# Patient Record
Sex: FEMALE | Race: BLACK OR AFRICAN AMERICAN | HISPANIC OR LATINO | Employment: UNEMPLOYED | URBAN - METROPOLITAN AREA
[De-identification: names, ages, dates, MRNs, and addresses within clinical notes are randomized per-mention and may not be internally consistent; named-entity substitution may affect disease eponyms.]

---

## 2018-07-09 ENCOUNTER — HOSPITAL ENCOUNTER (EMERGENCY)
Facility: HOSPITAL | Age: 58
Discharge: HOME | End: 2018-07-09
Attending: EMERGENCY MEDICINE

## 2018-07-09 VITALS
RESPIRATION RATE: 16 BRPM | OXYGEN SATURATION: 98 % | WEIGHT: 194 LBS | HEART RATE: 89 BPM | BODY MASS INDEX: 37.89 KG/M2 | TEMPERATURE: 98.1 F | SYSTOLIC BLOOD PRESSURE: 134 MMHG | DIASTOLIC BLOOD PRESSURE: 77 MMHG

## 2018-07-09 DIAGNOSIS — H10.503: Primary | ICD-10-CM

## 2018-07-09 PROCEDURE — 99283 EMERGENCY DEPT VISIT LOW MDM: CPT

## 2018-07-09 RX ORDER — BACITRACIN 500 [USP'U]/G
OINTMENT OPHTHALMIC 3 TIMES DAILY
Qty: 3.5 G | Refills: 0 | Status: SHIPPED | OUTPATIENT
Start: 2018-07-09 | End: 2022-04-04

## 2018-07-09 RX ORDER — BACITRACIN 500 [USP'U]/G
OINTMENT OPHTHALMIC ONCE
Status: COMPLETED | OUTPATIENT
Start: 2018-07-09 | End: 2018-07-09

## 2018-07-09 RX ADMIN — BACITRACIN 1 APPLICATION: 500 OINTMENT OPHTHALMIC at 20:55

## 2018-07-10 NOTE — ED PROVIDER NOTES
History  Chief Complaint   Patient presents with    Eye Pain     Pt  arrives with complaints of bilateral eye pain "for a while" states sees eye doctor and was told has "extra wax build up" states has been steaming eyes like told by eye doctor but still having pain  History provided by:  Patient   used: No    66 y/o female presents for re-eval of b/l eye discomfort for about a month  No visual changes  Has some conjunctival injection and also inflammation of lower lids  Had been seen by eye doctor at onset of sx and told she had excess wax buildup and was to steam eyes  Notes on abx at one point and had improved but worsened she she stopped  Discomfort moderate, fairly constant, both eyes  Plan abx ointment and ophtho re-eval  Likely blepharoconjunctivitis  None       Past Medical History:   Diagnosis Date    Hypertension        No past surgical history on file  No family history on file  I have reviewed and agree with the history as documented  Social History   Substance Use Topics    Smoking status: Current Every Day Smoker     Packs/day: 0 25    Smokeless tobacco: Never Used    Alcohol use Yes      Comment: socially        Review of Systems   Constitutional: Negative for activity change, appetite change, fatigue and fever  Eyes: Positive for pain, redness and visual disturbance  Negative for photophobia  Neurological: Negative for dizziness, weakness and headaches  All other systems reviewed and are negative  Physical Exam  Physical Exam   Constitutional: She is oriented to person, place, and time  She appears well-developed and well-nourished  No distress  HENT:   Head: Normocephalic and atraumatic  Eyes: EOM are normal  Pupils are equal, round, and reactive to light  Mildly injected b/l conjunctivae  Inflammation along b/l lower lid margin  Normal visual acuity per patient  Neck: Normal range of motion  Neck supple     Cardiovascular: Normal rate and regular rhythm  Pulmonary/Chest: Effort normal  No respiratory distress  Neurological: She is alert and oriented to person, place, and time  Skin: Skin is warm and dry  Psychiatric: She has a normal mood and affect  Her behavior is normal    Nursing note and vitals reviewed  Vital Signs  ED Triage Vitals   Temperature Pulse Respirations Blood Pressure SpO2   07/09/18 1946 07/09/18 1946 07/09/18 1946 07/09/18 2004 07/09/18 1946   98 1 °F (36 7 °C) 89 16 134/77 98 %      Temp Source Heart Rate Source Patient Position - Orthostatic VS BP Location FiO2 (%)   07/09/18 1946 07/09/18 1946 -- -- --   Oral Monitor         Pain Score       07/09/18 1946       8           Vitals:    07/09/18 1946 07/09/18 2004   BP:  134/77   Pulse: 89        Visual Acuity  Visual Acuity      Most Recent Value   L Pupil Size (mm)  3   R Pupil Size (mm)  3          ED Medications  Medications   bacitracin ophthalmic ointment (1 application Both Eyes Given 7/9/18 2055)       Diagnostic Studies  Results Reviewed     None                 No orders to display              Procedures  Procedures       Phone Contacts  ED Phone Contact    ED Course                               MDM  Number of Diagnoses or Management Options  Blepharoconjunctivitis of both eyes:   Diagnosis management comments: 64 y/o female with about a month of b/l eye discomfort with conjunctival and lower lid inflammation  No visual changes  Had improved on abx in the past  Given abx ointment and referred to ophtho      Patient Progress  Patient progress: stable    CritCare Time    Disposition  Final diagnoses:   Blepharoconjunctivitis of both eyes     Time reflects when diagnosis was documented in both MDM as applicable and the Disposition within this note     Time User Action Codes Description Comment    7/9/2018  8:44 PM Dandy Mooney Add [H10 503] Blepharoconjunctivitis of both eyes       ED Disposition     ED Disposition Condition Comment    Discharge Gulshan Beverly discharge to home/self care  Condition at discharge: Good        Follow-up Information     Follow up With Specialties Details Why Contact Info    Judye Dakins, MD Ophthalmology   [place] [address]  [address]  [phone]            Discharge Medication List as of 7/9/2018  8:46 PM      START taking these medications    Details   bacitracin ophthalmic ointment Administer to both eyes 3 (three) times a day, Starting Mon 7/9/2018, Print           No discharge procedures on file      ED Provider  Electronically Signed by           Nils Kelley MD  07/24/18 0662

## 2018-07-10 NOTE — DISCHARGE INSTRUCTIONS
Blepharitis   WHAT YOU NEED TO KNOW:   What is blepharitis? Blepharitis is inflammation of one or both eyelids  Your eyelid, eyelashes, oil glands, or whites of the eye may be affected  What causes blepharitis? · Seborrheic dermatitis (red, scaly, itchy skin)    · Clogged oil glands    · Infection on your eyelid caused by bacteria    · Acne rosacea  What are the signs and symptoms of blepharitis? · Burning and itching    · Redness of your eyelid or the whites of your eye    · Watery or dry eye    Feeling like there is something in your eye    · Decreased vision or sensitivity to light  How is blepharitis diagnosed? · A visual acuity test  is used to check your vision and eye movements  · A sample of skin or drainage  may be sent to a lab to find the cause of your infection  · A slit-lamp test  is used to examine your eye with a microscope  How is blepharitis treated? Medicines can help decrease pain and swelling, or treat an infection  How can I manage my symptoms? Always wash your hands with soap and water before and after eye care:  · Use artificial tears  twice a day if you have dry eye  · Apply a warm compress  for 10 minutes once a day to loosen crusts and to decrease itching and burning  · Gently scrub your upper and lower eyelid  with 2 to 3 drops of baby shampoo in ½ cup warm water 2 times a day  This will help open your clogged oil glands and remove pus or other material stuck to your eyelid  · Massage your upper and lower eyelid in small circles for 5 seconds  to loosen oil plugs and to decrease inflammation  · Do not wear contact lenses or eye makeup  until your eye has healed  When should I contact my healthcare provider? · Your vision changes  · Your signs and symptoms get worse, even after treatment  · Your signs and symptoms return  · You have a lump on your eyelid  · You have a pus-filled sore on your eyelid      · You have questions or concerns about your condition or care  When should I seek immediate care or call 911? · You have severe pain  · You have vision loss  CARE AGREEMENT:   You have the right to help plan your care  Learn about your health condition and how it may be treated  Discuss treatment options with your caregivers to decide what care you want to receive  You always have the right to refuse treatment  The above information is an  only  It is not intended as medical advice for individual conditions or treatments  Talk to your doctor, nurse or pharmacist before following any medical regimen to see if it is safe and effective for you  © 2017 [facility] [employer] Information is for End User's use only and may not be sold, redistributed or otherwise used for commercial purposes  All illustrations and images included in CareNotes® are the copyrighted property of A D A M , Inc  or [employer]

## 2020-11-17 ENCOUNTER — APPOINTMENT (EMERGENCY)
Dept: RADIOLOGY | Facility: HOSPITAL | Age: 60
End: 2020-11-17

## 2020-11-17 ENCOUNTER — HOSPITAL ENCOUNTER (EMERGENCY)
Facility: HOSPITAL | Age: 60
Discharge: HOME | End: 2020-11-17
Attending: EMERGENCY MEDICINE | Admitting: EMERGENCY MEDICINE

## 2020-11-17 VITALS
SYSTOLIC BLOOD PRESSURE: 149 MMHG | OXYGEN SATURATION: 100 % | DIASTOLIC BLOOD PRESSURE: 78 MMHG | WEIGHT: 190 LBS | HEART RATE: 89 BPM | TEMPERATURE: 97.3 F | RESPIRATION RATE: 17 BRPM

## 2020-11-17 DIAGNOSIS — S90.129A TOE CONTUSION: Primary | ICD-10-CM

## 2020-11-17 PROCEDURE — 99283 EMERGENCY DEPT VISIT LOW MDM: CPT

## 2020-11-17 PROCEDURE — 73660 X-RAY EXAM OF TOE(S): CPT

## 2020-11-17 PROCEDURE — 99285 EMERGENCY DEPT VISIT HI MDM: CPT | Performed by: EMERGENCY MEDICINE

## 2020-11-17 RX ORDER — IBUPROFEN 600 MG/1
600 TABLET ORAL EVERY 6 HOURS PRN
Qty: 60 TABLET | Refills: 0 | Status: SHIPPED | OUTPATIENT
Start: 2020-11-17 | End: 2022-04-04

## 2020-11-17 RX ORDER — IBUPROFEN 600 MG/1
600 TABLET ORAL ONCE
Status: COMPLETED | OUTPATIENT
Start: 2020-11-17 | End: 2020-11-17

## 2020-11-17 RX ADMIN — IBUPROFEN 600 MG: 600 TABLET, FILM COATED ORAL at 03:16

## 2021-11-22 ENCOUNTER — APPOINTMENT (EMERGENCY)
Dept: RADIOLOGY | Facility: HOSPITAL | Age: 61
End: 2021-11-22
Payer: COMMERCIAL

## 2021-11-22 ENCOUNTER — HOSPITAL ENCOUNTER (EMERGENCY)
Facility: HOSPITAL | Age: 61
Discharge: HOME | End: 2021-11-22
Payer: COMMERCIAL

## 2021-11-22 VITALS
SYSTOLIC BLOOD PRESSURE: 180 MMHG | DIASTOLIC BLOOD PRESSURE: 100 MMHG | WEIGHT: 200 LBS | TEMPERATURE: 97.4 F | RESPIRATION RATE: 18 BRPM | HEART RATE: 96 BPM | HEIGHT: 59 IN | BODY MASS INDEX: 40.32 KG/M2 | OXYGEN SATURATION: 97 %

## 2021-11-22 DIAGNOSIS — S43.401A SPRAIN OF RIGHT SHOULDER, UNSPECIFIED SHOULDER SPRAIN TYPE, INITIAL ENCOUNTER: Primary | ICD-10-CM

## 2021-11-22 PROCEDURE — 99284 EMERGENCY DEPT VISIT MOD MDM: CPT

## 2021-11-22 PROCEDURE — 73030 X-RAY EXAM OF SHOULDER: CPT

## 2021-11-22 PROCEDURE — 96372 THER/PROPH/DIAG INJ SC/IM: CPT

## 2021-11-22 PROCEDURE — 99283 EMERGENCY DEPT VISIT LOW MDM: CPT

## 2021-11-22 RX ORDER — CYCLOBENZAPRINE HCL 10 MG
10 TABLET ORAL 2 TIMES DAILY PRN
Qty: 20 TABLET | Refills: 0 | Status: SHIPPED | OUTPATIENT
Start: 2021-11-22 | End: 2022-04-04

## 2021-11-22 RX ORDER — KETOROLAC TROMETHAMINE 30 MG/ML
30 INJECTION, SOLUTION INTRAMUSCULAR; INTRAVENOUS ONCE
Status: COMPLETED | OUTPATIENT
Start: 2021-11-22 | End: 2021-11-22

## 2021-11-22 RX ORDER — NAPROXEN 500 MG/1
500 TABLET ORAL 2 TIMES DAILY WITH MEALS
Qty: 30 TABLET | Refills: 0 | Status: SHIPPED | OUTPATIENT
Start: 2021-11-22 | End: 2022-04-04

## 2021-11-22 RX ADMIN — KETOROLAC TROMETHAMINE 30 MG: 30 INJECTION, SOLUTION INTRAMUSCULAR at 15:29

## 2022-03-16 ENCOUNTER — TELEPHONE (OUTPATIENT)
Dept: FAMILY MEDICINE CLINIC | Facility: CLINIC | Age: 62
End: 2022-03-16

## 2022-04-04 ENCOUNTER — OFFICE VISIT (OUTPATIENT)
Dept: FAMILY MEDICINE CLINIC | Facility: CLINIC | Age: 62
End: 2022-04-04
Payer: COMMERCIAL

## 2022-04-04 VITALS
BODY MASS INDEX: 38.09 KG/M2 | HEIGHT: 60 IN | SYSTOLIC BLOOD PRESSURE: 160 MMHG | TEMPERATURE: 97.2 F | WEIGHT: 194 LBS | RESPIRATION RATE: 16 BRPM | HEART RATE: 85 BPM | DIASTOLIC BLOOD PRESSURE: 80 MMHG | OXYGEN SATURATION: 99 %

## 2022-04-04 DIAGNOSIS — Z12.4 CERVICAL CANCER SCREENING: ICD-10-CM

## 2022-04-04 DIAGNOSIS — Z85.3 HX OF BREAST CANCER: ICD-10-CM

## 2022-04-04 DIAGNOSIS — Z00.00 ANNUAL PHYSICAL EXAM: Primary | ICD-10-CM

## 2022-04-04 DIAGNOSIS — Z12.2 SCREENING FOR LUNG CANCER: ICD-10-CM

## 2022-04-04 DIAGNOSIS — Z12.31 BREAST CANCER SCREENING BY MAMMOGRAM: ICD-10-CM

## 2022-04-04 DIAGNOSIS — Z28.21 TETANUS, DIPHTHERIA, AND ACELLULAR PERTUSSIS (TDAP) VACCINATION DECLINED: ICD-10-CM

## 2022-04-04 DIAGNOSIS — Z11.4 ENCOUNTER FOR SCREENING FOR HIV: ICD-10-CM

## 2022-04-04 DIAGNOSIS — Z78.0 POSTMENOPAUSAL: ICD-10-CM

## 2022-04-04 DIAGNOSIS — Z87.891 QUIT SMOKING: ICD-10-CM

## 2022-04-04 DIAGNOSIS — Z28.21 PNEUMOCOCCAL VACCINATION DECLINED: ICD-10-CM

## 2022-04-04 DIAGNOSIS — Z13.31 DEPRESSION SCREENING NEGATIVE: ICD-10-CM

## 2022-04-04 DIAGNOSIS — R03.0 ELEVATED BLOOD PRESSURE READING: ICD-10-CM

## 2022-04-04 DIAGNOSIS — Z11.59 NEED FOR HEPATITIS C SCREENING TEST: ICD-10-CM

## 2022-04-04 DIAGNOSIS — Z28.21 INFLUENZA VACCINATION DECLINED: ICD-10-CM

## 2022-04-04 DIAGNOSIS — Z12.11 COLON CANCER SCREENING: ICD-10-CM

## 2022-04-04 DIAGNOSIS — Z28.21 COVID-19 VACCINATION DECLINED: ICD-10-CM

## 2022-04-04 DIAGNOSIS — Z85.3 HISTORY OF BREAST CANCER: ICD-10-CM

## 2022-04-04 PROCEDURE — 99214 OFFICE O/P EST MOD 30 MIN: CPT | Performed by: INTERNAL MEDICINE

## 2022-04-04 PROCEDURE — 99396 PREV VISIT EST AGE 40-64: CPT | Performed by: INTERNAL MEDICINE

## 2022-04-04 NOTE — PROGRESS NOTES
[facility]    NAME: Karl Childers  AGE: 62 y o   SEX: female  : 1960     DATE: 2022     Assessment and Plan:     Problem List Items Addressed This Visit        Other    History of breast cancer     Right breast, s/p lumpectomy and radiation rx         Elevated blood pressure reading     BP high today-Yadira says she has anxiety/white coat htn-advised her on diet, exercise, weight loss program, DASH low sodium diet, will monitor bp at home and bring in readings recheck in 2 months         Postmenopausal     Advised on taking calcium vitamin D with meals and will need DXA at some point           Other Visit Diagnoses     Annual physical exam    -  Primary    Relevant Orders    CBC and differential    Comprehensive metabolic panel    Lipid panel    TSH, 3rd generation    HIV 1/2 Antigen/Antibody (4th Generation) w Reflex [facility]    Hepatitis C antibody    BMI 37 0-37 9, adult        COVID-19 vaccination declined        Influenza vaccination declined        Tetanus, diphtheria, and acellular pertussis (Tdap) vaccination declined        Pneumococcal vaccination declined        Breast cancer screening by mammogram        Relevant Orders    Mammo diagnostic bilateral w cad    Hx of breast cancer        Relevant Orders    Mammo diagnostic bilateral w cad    Need for hepatitis C screening test        Relevant Orders    Hepatitis C antibody    Encounter for screening for HIV        Relevant Orders    HIV 1/2 Antigen/Antibody (4th Generation) w Reflex [facility]    Colon cancer screening        Relevant Orders    Cologuard    Depression screening negative        Quit smoking        Cervical cancer screening        Relevant Orders    Ambulatory Referral to Obstetrics / Gynecology    Screening for lung cancer        Relevant Orders    CT lung screening program          Immunizations and preventive care screenings were discussed with patient today  Appropriate education was printed on patient's after visit summary  Counseling:  Alcohol/drug use: discussed moderation in alcohol intake, the recommendations for healthy alcohol use, and avoidance of illicit drug use  Dental Health: discussed importance of regular tooth brushing, flossing, and dental visits  · Exercise: the importance of regular exercise/physical activity was discussed  Recommend exercise 3-5 times per week for at least 30 minutes  Return in about 2 months (around 6/4/2022), or Blood pressure check, go over labs, for Recheck  Chief Complaint:     Chief Complaint   Patient presents with    Physical Exam     last seen 2019      History of Present Illness:     Adult Annual Physical   Patient here for a comprehensive physical exam  The patient reports no problems  Diet and Physical Activity  · Diet/Nutrition: well balanced diet  · Exercise: walking  Depression Screening  PHQ-2/9 Depression Screening    Little interest or pleasure in doing things: 0 - not at all  Feeling down, depressed, or hopeless: 0 - not at all  PHQ-2 Score: 0  PHQ-2 Interpretation: Negative depression screen       General Health  · Sleep: sleeps well  · Hearing: normal - bilateral   · Vision: goes for regular eye exams  · Dental: regular dental visits  /GYN Health  · Patient is: postmenopausal  · Last menstrual period: s/p hysterectomy  · Contraceptive method: hysterectomy  Review of Systems:     Review of Systems   Constitutional: Negative  HENT: Negative  Respiratory: Negative  Cardiovascular: Negative  Musculoskeletal: Negative  Neurological: Negative         Past Medical History:     Past Medical History:   Diagnosis Date    Anxiety disorder     Hyperlipidemia     Hypertension     Malignant neoplasm of right female breast ([address] )     Migraine     Shoulder pain     Smoker     Sprain of right shoulder     Vitamin D deficiency       Past Surgical History:     Past Surgical History:   Procedure Laterality Date    APPENDECTOMY      during hysterectomy    BREAST LUMPECTOMY Right 2012    MAMMO (HISTORICAL) Bilateral 2018    MAMMO (HISTORICAL) Bilateral 09/10/2019    MAMMO (HISTORICAL) Bilateral 2017    TONSILLECTOMY      US BREAST NEEDLE LOC RIGHT Right 2012      Social History:     Social History     Socioeconomic History    Marital status: Single     Spouse name: None    Number of children: None    Years of education: 2 yr college    Highest education level: None   Occupational History    Occupation: Retired   Tobacco Use    Smoking status: Former Smoker     Packs/day: 0 25     Years: 10 00     Pack years: 2 50    Smokeless tobacco: Never Used    Tobacco comment: Quit 2022    Vaping Use    Vaping Use: Never used   Substance and Sexual Activity    Alcohol use: Yes     Comment: socially    Drug use: No    Sexual activity: Not Currently   Other Topics Concern    None   Social History Narrative    Most recent tobacco use screenin-    Do you currently or have you served in the [employer]: No    Occupation: retired    Education: [address] [employer] ([employer]: [place]    Marital status: Single    Exercise level: Occasional    Alcohol intake: Occasional    Caffeine intake: Occasional    Chewing tobacco: none    Seat belts used routinely: Yes    Sunscreen used routinely: No    Smoke alarm in home: Yes    Advance directive: No    Live alone or with others: alone    International travel: none    Pets: Yes    turtle     Social Determinants of Health     Financial Resource Strain: Not on file   Food Insecurity: Not on file   Transportation Needs: Not on file   Physical Activity: Not on file   Stress: Not on file   Social Connections: Not on file   Intimate Partner Violence: Not on file   Housing Stability: Not on file      Family History:     Family History   Problem Relation Age of Onset    Hypertension Mother     Heart disease Mother  Lung cancer Mother     Colon cancer Father     Breast cancer Maternal Aunt       Current Medications:     No current outpatient medications on file  No current facility-administered medications for this visit  Allergies:     No Known Allergies   Physical Exam:     /80   Pulse 85   Temp (!) 97 2 °F (36 2 °C) (Temporal)   Resp 16   Ht 5' (1 524 m)   Wt 88 kg (194 lb)   SpO2 99%   BMI 37 89 kg/m²     Physical Exam  Vitals reviewed  Constitutional:       Appearance: She is obese  HENT:      Head: Normocephalic and atraumatic  Right Ear: External ear normal       Left Ear: External ear normal       Nose: Nose normal       Mouth/Throat:      Mouth: Mucous membranes are moist    Eyes:      Extraocular Movements: Extraocular movements intact  Conjunctiva/sclera: Conjunctivae normal    Cardiovascular:      Rate and Rhythm: Normal rate and regular rhythm  Heart sounds: Normal heart sounds  Pulmonary:      Effort: Pulmonary effort is normal       Breath sounds: Normal breath sounds  Abdominal:      Palpations: Abdomen is soft  Musculoskeletal:         General: Normal range of motion  Cervical back: Normal range of motion and neck supple  Skin:     General: Skin is warm  Capillary Refill: Capillary refill takes less than 2 seconds  Neurological:      General: No focal deficit present  Mental Status: She is alert  Psychiatric:         Mood and Affect: Mood normal          Thought Content: Thought content normal          Judgment: Judgment normal           Hector Sinclair MD  [facility]     BMI Counseling: Body mass index is 37 89 kg/m²   The BMI is above normal  Nutrition recommendations include reducing portion sizes, decreasing overall calorie intake, 3-5 servings of fruits/vegetables daily, reducing fast food intake, consuming healthier snacks, decreasing soda and/or juice intake, moderation in carbohydrate intake, increasing intake of lean protein, reducing intake of saturated fat and trans fat and reducing intake of cholesterol  Exercise recommendations include exercising 3-5 times per week, joining a gym and strength training exercises

## 2022-04-04 NOTE — ASSESSMENT & PLAN NOTE
BP high today-Yadira says she has anxiety/white coat htn-advised her on diet, exercise, weight loss program, DASH low sodium diet, will monitor bp at home and bring in readings recheck in 2 months

## 2022-04-04 NOTE — PATIENT INSTRUCTIONS

## 2022-04-11 ENCOUNTER — OFFICE VISIT (OUTPATIENT)
Dept: OBGYN CLINIC | Facility: CLINIC | Age: 62
End: 2022-04-11
Payer: COMMERCIAL

## 2022-04-11 VITALS — SYSTOLIC BLOOD PRESSURE: 162 MMHG | DIASTOLIC BLOOD PRESSURE: 90 MMHG | BODY MASS INDEX: 37.89 KG/M2 | HEIGHT: 60 IN

## 2022-04-11 DIAGNOSIS — Z12.4 CERVICAL CANCER SCREENING: ICD-10-CM

## 2022-04-11 DIAGNOSIS — Z01.419 ENCOUNTER FOR GYNECOLOGICAL EXAMINATION WITHOUT ABNORMAL FINDING: Primary | ICD-10-CM

## 2022-04-11 DIAGNOSIS — Z85.3 HISTORY OF BREAST CANCER: ICD-10-CM

## 2022-04-11 DIAGNOSIS — R03.0 ELEVATED BLOOD PRESSURE READING: ICD-10-CM

## 2022-04-11 PROCEDURE — 99386 PREV VISIT NEW AGE 40-64: CPT | Performed by: PHYSICIAN ASSISTANT

## 2022-04-11 PROCEDURE — 87624 HPV HI-RISK TYP POOLED RSLT: CPT | Performed by: PHYSICIAN ASSISTANT

## 2022-04-11 PROCEDURE — G0145 SCR C/V CYTO,THINLAYER,RESCR: HCPCS | Performed by: PHYSICIAN ASSISTANT

## 2022-04-11 PROCEDURE — G0476 HPV COMBO ASSAY CA SCREEN: HCPCS | Performed by: PHYSICIAN ASSISTANT

## 2022-04-11 NOTE — PROGRESS NOTES
ASSESSMENT & PLAN: Sarika Hale is a 62 y o  [id number] with normal gynecologic exam     1   Routine well woman exam done today  2  Pap and HPV:  The patient's last pap and hpv was 2019, according to last records Dr Caity Cantu  It was abnormal   ASCUS, neg HPV  Pap and cotesting was done today  I could not fully appreciate cervix on exam despite attempts to reposition patient and speculum, within reason of not making patient uncomfortable  Will consider repeat PAP next year depending on results  Current ASCCP Guidelines reviewed  3   Mammogram ordered by her PCP - diag  B/L mammogram, pt encouraged to schedule  4   Cologuard ordered by PCP, as pt declines colonoscopy  Encouraged pt to complete  5  The following were reviewed in today's visit: breast self exam, adequate intake of calcium and vitamin D, exercise and healthy diet  6  Applauded pt on efforts to loose weight, quit smoking x 2 months now and improve BP naturally  However BP remains elevated today, 162/90  She has appt for f/u with Dr Landy Stock her PCP in June and encouraged keeping that appt for BP recheck and completing [address] prior  Discussed potential associated risks with having untreated HTN  Pt expresses understanding  RTO 1 year for annual      CC:  Annual Gynecologic Examination    HPI: Sarika Hale is a 62 y o  [id number] who presents for annual gynecologic examination  She has the following concerns:  None  She denies any active medical problems, but states working to loose weight because of elevated BP at PCP office  Tobacco - quit smoking 2 months ago  ETOH - denies  Illicit Drug use: denies    Healthy diet Yes - states eating healthier to loose weight and bring BP down  States intentionally lost 15lbs thus far  Vitamins Yes -Vit D, calcium, B complex, Vit C and zinc   Exercise Yes    No LMP recorded  Patient is postmenopausal     Pt states hx of hysterectomy secondary to fibroids  Thinks approx 2012    States she has cervix, states took ovaries and uterus  Per 2019 note Dr Martell Padilla, had supracervical hysterectomy, no mention of oophorectomy  Bowel or bladder changes: she changed eating habits and has noticed BM regimen slightly different  Denies blood in stool  Denies urinary issues  Health Maintenance:    She wears her seatbelt routinely  She does perform regular monthly self breast exams  Denies any acute breast pain, skin changes, nipple discharge or breast lumps  States has always had right breast pain since breast CA in right breast and surgery years ago  She feels safe at home  Last Pap: 2019 - ASCUS, neg HPV  Per records  Last mammogram: 2019, [facility]; hx of breast cancer, approx 7-8 years ago  Last colonoscopy: never had done  Past Medical History:   Diagnosis Date    Anxiety disorder     Hyperlipidemia     Hypertension     Malignant neoplasm of right female breast ([address] )     Migraine     Shoulder pain     Smoker     Sprain of right shoulder     Vitamin D deficiency        Past Surgical History:   Procedure Laterality Date    APPENDECTOMY      during hysterectomy    BREAST LUMPECTOMY Right 2012    MAMMO (HISTORICAL) Bilateral 2018    MAMMO (HISTORICAL) Bilateral 09/10/2019    MAMMO (HISTORICAL) Bilateral 2017    TONSILLECTOMY      US BREAST NEEDLE LOC RIGHT Right 2012       Past OB/Gyn History:  OB History        5    Para   2    Term   2            AB   3    Living   2       SAB   3    IAB        Ectopic        Multiple        Live Births                   Pt does not have menstrual issues  History of sexually transmitted infection: No   History of abnormal pap smears: pt denies  [facility] ascus, neg HPV       Patient is not currently sexually active  Denies any vaginal issues today        Family History   Problem Relation Age of Onset    Hypertension Mother     Heart disease Mother     Lung cancer Mother     Colon cancer Father     Breast cancer Maternal Aunt        Family history of Breast/Uterine/Ovarian/Colon Cancer: as in HPI  Pt already had genetic testing secondary to personal hx breast CA  Social History:  Social History     Socioeconomic History    Marital status: Single     Spouse name: Not on file    Number of children: Not on file    Years of education: 2 yr college    Highest education level: Not on file   Occupational History    Occupation: Retired   Tobacco Use    Smoking status: Former Smoker     Packs/day: 0 25     Years: 10 00     Pack years: 2 50    Smokeless tobacco: Never Used    Tobacco comment: Quit 2022    Vaping Use    Vaping Use: Never used   Substance and Sexual Activity    Alcohol use: Yes     Comment: socially    Drug use: No    Sexual activity: Not Currently   Other Topics Concern    Not on file   Social History Narrative    Most recent tobacco use screenin-    Do you currently or have you served in [employer]: No    Occupation: retired    Education: [address]    Marital status: Single    Exercise level: Occasional    Alcohol intake: Occasional    Caffeine intake: Occasional    Chewing tobacco: none    Seat belts used routinely: Yes    Sunscreen used routinely: No    Smoke alarm in home: Yes    Advance directive: No    Live alone or with others: alone    International travel: none    Pets: Yes    turtle     Social Determinants of Health     Financial Resource Strain: Not on file   Food Insecurity: Not on file   Transportation Needs: Not on file   Physical Activity: Not on file   Stress: Not on file   Social Connections: Not on file   Intimate Partner Violence: Not on file   Housing Stability: Not on file     No Known Allergies  No current outpatient medications on file        Review of Systems  Constitutional :no fever, feels well, no tiredness, no recent weight gain or loss  Cardiovascular: no complaints of slow or fast heart beat, no chest pain, no palpitations, no leg claudication or lower extremity edema  Respiratory: no complaints of shortness of shortness of breath, no NOWAK  Breasts:no complaints of breast pain, breast lump, or nipple discharge  Gastrointestinal: no complaints of abdominal pain, constipation, nausea, vomiting, or diarrhea or bloody stools  Genitourinary : no complaints of dysuria, incontinence, pelvic pain, no dysmenorrhea, vaginal discharge or abnormal vaginal bleeding and as noted in HPI  Musculoskeletal: no complaints of arthralgia, no myalgia, no joint swelling or stiffness, no limb pain or swelling  Integumentary: no complaints of skin rash or lesion, itching or dry skin  Neurological: no complaints of headache, no confusion, no numbness or tingling, no dizziness or fainting  MH: denies depression or anxiety sxs    Objective      /90 (BP Location: Left arm, Patient Position: Sitting, Cuff Size: Standard)   Ht 5' (1 524 m)   BMI 37 89 kg/m²     General:   appears stated age, cooperative, alert normal mood and affect   Neck: normal, supple,trachea midline, no masses  Thyroid non-tender  Heart: regular rate and rhythm, S1, S2 normal, no murmur, click, rub or gallop   Lungs: clear to auscultation bilaterally   Breasts: No nipple retraction or dimpling, No nipple discharge or bleeding, No axillary or supraclavicular adenopathy, Normal to palpation without dominant masses  Left breast larger than right secondary to right breast lumpectomy 2012, surgical scar also appreciated right breast inferior areola  Abdomen: soft, non-tender, without masses or organomegaly   Vulva: no lesions, normal female hair distribution   Vagina: normal vagina, no discharge, exudate, lesion, or erythema and vaginal dryness and normal mild atrophy noted  Urethra: normal   Cervix: PAP done  I could not appreciate full visualization of cervix on exam despite maneuvering speculum and having pt reposition   also could not palpate on bimanual exam    Uterus: uterus absent   Adnexa: no mass, fullness, tenderness   Lymphatic palpation of lymph nodes in neck, axilla, groin and/or other locations: no lymphadenopathy or masses noted   Skin normal skin turgor and no rashes     Psychiatric orientation to person, place, and time: normal  mood and affect: normal

## 2022-04-13 LAB
HPV HR 12 DNA CVX QL NAA+PROBE: NEGATIVE
HPV16 DNA CVX QL NAA+PROBE: NEGATIVE
HPV18 DNA CVX QL NAA+PROBE: NEGATIVE

## 2022-04-16 LAB
LAB AP GYN PRIMARY INTERPRETATION: NORMAL
Lab: NORMAL

## 2022-05-14 LAB
ALBUMIN SERPL-MCNC: 4.3 G/DL (ref 3.6–5.1)
ALBUMIN/GLOB SERPL: 1.4 (CALC) (ref 1–2.5)
ALP SERPL-CCNC: 50 U/L (ref 37–153)
ALT SERPL-CCNC: 33 U/L (ref 6–29)
AST SERPL-CCNC: 25 U/L (ref 10–35)
BASOPHILS # BLD AUTO: 50 CELLS/UL (ref 0–200)
BASOPHILS NFR BLD AUTO: 0.8 %
BILIRUB SERPL-MCNC: 0.8 MG/DL (ref 0.2–1.2)
BUN SERPL-MCNC: 20 MG/DL (ref 7–25)
BUN/CREAT SERPL: ABNORMAL (CALC) (ref 6–22)
CALCIUM SERPL-MCNC: 10 MG/DL (ref 8.6–10.4)
CHLORIDE SERPL-SCNC: 104 MMOL/L (ref 98–110)
CHOLEST SERPL-MCNC: 185 MG/DL
CHOLEST/HDLC SERPL: 3.6 (CALC)
CO2 SERPL-SCNC: 30 MMOL/L (ref 20–32)
CREAT SERPL-MCNC: 0.89 MG/DL (ref 0.5–0.99)
EOSINOPHIL # BLD AUTO: 290 CELLS/UL (ref 15–500)
EOSINOPHIL NFR BLD AUTO: 4.6 %
ERYTHROCYTE [DISTWIDTH] IN BLOOD BY AUTOMATED COUNT: 13.2 % (ref 11–15)
GLOBULIN SER CALC-MCNC: 3.1 G/DL (CALC) (ref 1.9–3.7)
GLUCOSE SERPL-MCNC: 80 MG/DL (ref 65–99)
HCT VFR BLD AUTO: 44.2 % (ref 35–45)
HCV AB S/CO SERPL IA: 0.14
HCV AB SERPL QL IA: NORMAL
HDLC SERPL-MCNC: 52 MG/DL
HGB BLD-MCNC: 14.2 G/DL (ref 11.7–15.5)
HIV 1+2 AB+HIV1 P24 AG SERPL QL IA: NORMAL
LDLC SERPL CALC-MCNC: 112 MG/DL (CALC)
LYMPHOCYTES # BLD AUTO: 2583 CELLS/UL (ref 850–3900)
LYMPHOCYTES NFR BLD AUTO: 41 %
MCH RBC QN AUTO: 27.3 PG (ref 27–33)
MCHC RBC AUTO-ENTMCNC: 32.1 G/DL (ref 32–36)
MCV RBC AUTO: 84.8 FL (ref 80–100)
MONOCYTES # BLD AUTO: 517 CELLS/UL (ref 200–950)
MONOCYTES NFR BLD AUTO: 8.2 %
NEUTROPHILS # BLD AUTO: 2860 CELLS/UL (ref 1500–7800)
NEUTROPHILS NFR BLD AUTO: 45.4 %
NONHDLC SERPL-MCNC: 133 MG/DL (CALC)
PLATELET # BLD AUTO: 245 THOUSAND/UL (ref 140–400)
PMV BLD REES-ECKER: 11.8 FL (ref 7.5–12.5)
POTASSIUM SERPL-SCNC: 4.4 MMOL/L (ref 3.5–5.3)
PROT SERPL-MCNC: 7.4 G/DL (ref 6.1–8.1)
RBC # BLD AUTO: 5.21 MILLION/UL (ref 3.8–5.1)
SL AMB EGFR AFRICAN AMERICAN: 82 ML/MIN/1.73M2
SL AMB EGFR NON AFRICAN AMERICAN: 70 ML/MIN/1.73M2
SODIUM SERPL-SCNC: 141 MMOL/L (ref 135–146)
TRIGL SERPL-MCNC: 106 MG/DL
TSH SERPL-ACNC: 0.7 MIU/L (ref 0.4–4.5)
WBC # BLD AUTO: 6.3 THOUSAND/UL (ref 3.8–10.8)

## 2022-05-28 ENCOUNTER — HOSPITAL ENCOUNTER (OUTPATIENT)
Dept: RADIOLOGY | Facility: HOSPITAL | Age: 62
Discharge: HOME | End: 2022-05-28
Attending: INTERNAL MEDICINE
Payer: COMMERCIAL

## 2022-05-28 DIAGNOSIS — Z12.2 SCREENING FOR LUNG CANCER: ICD-10-CM

## 2022-05-28 PROCEDURE — 71271 CT THORAX LUNG CANCER SCR C-: CPT

## 2022-06-02 ENCOUNTER — TELEPHONE (OUTPATIENT)
Dept: FAMILY MEDICINE CLINIC | Facility: CLINIC | Age: 62
End: 2022-06-02

## 2022-06-02 NOTE — TELEPHONE ENCOUNTER
Patient called and is having flu like symptoms she took a home covid test that came back positive wants to know what she should do as she is not feeling any better

## 2022-06-03 ENCOUNTER — TELEMEDICINE (OUTPATIENT)
Dept: FAMILY MEDICINE CLINIC | Facility: CLINIC | Age: 62
End: 2022-06-03
Payer: COMMERCIAL

## 2022-06-03 DIAGNOSIS — U07.1 TELEHEALTH ENCOUNTER FOR CONFIRMED COVID-19: Primary | ICD-10-CM

## 2022-06-03 PROCEDURE — 99214 OFFICE O/P EST MOD 30 MIN: CPT | Performed by: INTERNAL MEDICINE

## 2022-06-03 NOTE — PROGRESS NOTES
COVID-19 Outpatient Progress Note    Assessment/Plan:Yadira with covid since last Sunday- doing fine-positive home test-advised rest, fluids, motrin/tylenol, will hold off on paxlovid for now-advised to get pulse oximeter    Problem List Items Addressed This Visit    None     Visit Diagnoses     Telehealth encounter for confirmed COVID-19    -  Primary         Disposition:     Patient is not fully vaccinated and I recommended self quarantine for 5 days followed by strict mask use for an additional 5 days  If patient were to develop symptoms, they should immediately self isolate and call our office for further guidance  I have spent 20 minutes directly with the patient  Greater than 50% of this time was spent in counseling/coordination of care regarding: diagnostic results, risks and benefits of treatment options, instructions for management, patient and family education and impressions  Encounter provider Dany Malcolm MD    Provider located at [address] [place] [address] [facility] [id number] [phone]    Recent Visits  Date Type Provider Dept   06/02/22 Telephone [address] [place] [address] recent visits within past 7 days and meeting all other requirements  Today's Visits  Date Type Provider Dept   06/03/22 Telemedicine Mookie Spain MD [facility] [address] today's visits and meeting all other requirements  Future Appointments  No visits were found meeting these conditions  Showing future appointments within next 150 days and meeting all other requirements     This virtual check-in was done via  [address] and patient was informed that this is a secure, HIPAA-compliant platform  She agrees to proceed  Patient agrees to participate in a virtual check in via telephone or video visit instead of presenting to the office to address urgent/immediate medical needs  Patient is aware this is a billable service      After connecting through [place], the patient was identified by name and date of birth  Mortimer Merles was informed that this was a telemedicine visit and that the exam was being conducted confidentially over secure lines  My office door was closed  Mortimer Merles acknowledged consent and understanding of privacy and security of the telemedicine visit  I informed the patient that I have reviewed her record in Epic and presented the opportunity for her to ask any questions regarding the visit today  The patient agreed to participate  Verification of patient location:  Patient is located in the following state in which I hold an active license: [place]    Subjective:   Mortimer Merles is a 62 y o  female who is concerned about COVID-19  Patient's symptoms include fever, chills, malaise, nasal congestion, sore throat, cough and myalgias       - Date of symptom onset: 5/29/2022      COVID-19 vaccination status: Not vaccinated    Exposure:   Contact with a person who is under investigation (PUI) for or who is positive for COVID-19 within the last 14 days?: No    Hospitalized recently for fever and/or lower respiratory symptoms?: No      Currently a healthcare worker that is involved in direct patient care?: No      Works in a special setting where the risk of COVID-19 transmission may be high? (this may include long-term care, correctional and snf facilities; homeless shelters; assisted-living facilities and group homes ): No      Resident in a special setting where the risk of COVID-19 transmission may be high? (this may include long-term care, correctional and snf facilities; homeless shelters; assisted-living facilities and group homes ): No      No results found for: [address] [facility] [address], 185 [facility], [address] [facility] [facility] [address] 116, 350 [facility], 700 [facility]  Past Medical History:   Diagnosis Date    Anxiety disorder     COVID-19 06/02/2022    via home rapid COVID test    Hyperlipidemia     Hypertension     Malignant neoplasm of right female breast ([facility] [address] )     Migraine     Shoulder pain     Smoker     Sprain of right shoulder     Vitamin D deficiency      Past Surgical History:   Procedure Laterality Date    APPENDECTOMY      during hysterectomy    BREAST LUMPECTOMY Right 2012    MAMMO (HISTORICAL) Bilateral 08/17/2018    MAMMO (HISTORICAL) Bilateral 09/10/2019    MAMMO (HISTORICAL) Bilateral 09/20/2017    TONSILLECTOMY      US BREAST NEEDLE LOC RIGHT Right 07/05/2012     No current outpatient medications on file  No current facility-administered medications for this visit  No Known Allergies    Review of Systems   Constitutional: Positive for chills and fever  HENT: Positive for congestion and sore throat  Respiratory: Positive for cough  Musculoskeletal: Positive for myalgias  Objective: There were no vitals filed for this visit  Physical Exam  Vitals reviewed  Constitutional:       Appearance: Normal appearance  She is normal weight  HENT:      Head: Normocephalic and atraumatic  Nose: Nose normal    Pulmonary:      Effort: Pulmonary effort is normal    Musculoskeletal:      Cervical back: Normal range of motion  Neurological:      Mental Status: She is alert and oriented to person, place, and time  Psychiatric:         Mood and Affect: Mood normal          Behavior: Behavior normal          Thought Content: Thought content normal          Judgment: Judgment normal          VIRTUAL VISIT DISCLAIMER    Ca Guardian verbally agrees to participate in [employer]  Pt is aware that [employer] could be limited without vital signs or the ability to perform a full hands-on physical Molly Kelly understands she or the provider may request at any time to terminate the video visit and request the patient to seek care or treatment in person

## 2022-06-09 ENCOUNTER — TELEPHONE (OUTPATIENT)
Dept: FAMILY MEDICINE CLINIC | Facility: CLINIC | Age: 62
End: 2022-06-09

## 2022-06-09 ENCOUNTER — OFFICE VISIT (OUTPATIENT)
Dept: FAMILY MEDICINE CLINIC | Facility: CLINIC | Age: 62
End: 2022-06-09
Payer: COMMERCIAL

## 2022-06-09 VITALS
BODY MASS INDEX: 38.09 KG/M2 | HEART RATE: 94 BPM | DIASTOLIC BLOOD PRESSURE: 90 MMHG | WEIGHT: 194 LBS | SYSTOLIC BLOOD PRESSURE: 160 MMHG | OXYGEN SATURATION: 98 % | RESPIRATION RATE: 16 BRPM | TEMPERATURE: 97.3 F | HEIGHT: 60 IN

## 2022-06-09 DIAGNOSIS — Z13.820 OSTEOPOROSIS SCREENING: Primary | ICD-10-CM

## 2022-06-09 DIAGNOSIS — I10 PRIMARY HYPERTENSION: ICD-10-CM

## 2022-06-09 PROCEDURE — 99214 OFFICE O/P EST MOD 30 MIN: CPT | Performed by: INTERNAL MEDICINE

## 2022-06-09 RX ORDER — HYDROCHLOROTHIAZIDE 25 MG/1
25 TABLET ORAL DAILY
Qty: 30 TABLET | Refills: 3 | Status: SHIPPED | OUTPATIENT
Start: 2022-06-09 | End: 2022-07-25

## 2022-06-09 NOTE — TELEPHONE ENCOUNTER
Well, you can either double book a spot, put her in later today or I guess she'd have to go to the ER, which is ridiculous

## 2022-06-09 NOTE — TELEPHONE ENCOUNTER
Patient called asking if she could get in to see you today for her BP and you do not have any openings  She took her BP this morning and it was 147/93  She has a headache and is dizzy

## 2022-06-09 NOTE — ASSESSMENT & PLAN NOTE
Yadira's been having elevated blood pressure at home and bp is elevated again here today both initially and upon retake-we spoke about sodium reduction, eating healthy, weight loss and not drinking alcohol-will go ahead and start some HCTZ and have pt monitor bp at home-return in 6 weeks or so for recheck-

## 2022-06-09 NOTE — PROGRESS NOTES
Assessment/Plan:         Problem List Items Addressed This Visit        Cardiovascular and Mediastinum    Primary hypertension     Yadira's been having elevated blood pressure at home and bp is elevated again here today both initially and upon retake-we spoke about sodium reduction, eating healthy, weight loss and not drinking alcohol-will go ahead and start some HCTZ and have pt monitor bp at home-return in 6 weeks or so for recheck-                                                                                                                                                                                                                                                                                                                                                                                                                                                                                                                                                                                                                                                                                                                                                                                           Relevant Medications    hydrochlorothiazide (HYDRODIURIL) 25 mg tablet      Other Visit Diagnoses     Osteoporosis screening    -  Primary    Relevant Orders    DXA bone density spine hip and pelvis            Subjective:      Patient ID: Mike Perez is a 62 y o  female      Gilbert Marquez here with a hx of elevated bp readings--she called this morning because her bp was running high and she was dizzy, with some right sided facial numbness, and had a headache-feels better now-says she no longer drinks or smokes-I went over all of her recently done bloodwork with her, which was normal      The following portions of the patient's history were reviewed and updated as appropriate:   Past Medical History:  She has a past medical history of Allergy to lisinopril, Anxiety disorder, COVID-19 (06/02/2022), Hyperlipidemia, Hypertension, Malignant neoplasm of right female breast ([address] ), Migraine, Shoulder pain, Smoker, Sprain of right shoulder, and Vitamin D deficiency  ,  _______________________________________________________________________  Medical Problems:  does not have any pertinent problems on file ,  _______________________________________________________________________  Past Surgical History:   has a past surgical history that includes Appendectomy; Breast lumpectomy (Right, 2012); Tonsillectomy; Mammo (historical) (Bilateral, 08/17/2018); US breast needle loc right (Right, 07/05/2012); Mammo (historical) (Bilateral, 09/10/2019); and Mammo (historical) (Bilateral, 09/20/2017)  ,  _______________________________________________________________________  Family History:  family history includes Breast cancer in her maternal aunt; Colon cancer in her father; Heart disease in her mother; Hypertension in her mother; Lung cancer in her mother ,  _______________________________________________________________________  Social History:   reports that she has quit smoking  She has a 2 50 pack-year smoking history  She has never used smokeless tobacco  She reports current alcohol use  She reports that she does not use drugs  ,  _______________________________________________________________________  Allergies:  is allergic to lisinopril     _______________________________________________________________________  Current Outpatient Medications   Medication Sig Dispense Refill    hydrochlorothiazide (HYDRODIURIL) 25 mg tablet Take 1 tablet (25 mg total) by mouth daily 30 tablet 3     No current facility-administered medications for this visit      _______________________________________________________________________  Review of Systems   Constitutional: Negative for fatigue  HENT: Negative for sinus pain  Respiratory: Negative    Negative for shortness of breath  Cardiovascular: Negative  Gastrointestinal: Negative  Endocrine: Positive for heat intolerance  Musculoskeletal: Negative  Neurological: Positive for dizziness, weakness, numbness and headaches  Objective:  Vitals:    06/09/22 1500 06/09/22 1524   BP: 152/90 160/90   BP Location: Left arm    Patient Position: Sitting    Cuff Size: Adult    Pulse: 94    Resp: 16    Temp: (!) 97 3 °F (36 3 °C)    TempSrc: Temporal    SpO2: 98%    Weight: 88 kg (194 lb)    Height: 5' (1 524 m)      Body mass index is 37 89 kg/m²  Physical Exam  Constitutional:       Appearance: She is obese  HENT:      Head: Normocephalic and atraumatic  Right Ear: External ear normal       Left Ear: External ear normal       Nose: Nose normal    Eyes:      Extraocular Movements: Extraocular movements intact  Pupils: Pupils are equal, round, and reactive to light  Neck:      Vascular: No carotid bruit  Cardiovascular:      Rate and Rhythm: Normal rate and regular rhythm  Heart sounds: Normal heart sounds  Pulmonary:      Effort: Pulmonary effort is normal       Breath sounds: Normal breath sounds  Musculoskeletal:      Cervical back: Normal range of motion and neck supple  Neurological:      General: No focal deficit present  Mental Status: She is alert and oriented to person, place, and time  Mental status is at baseline  Motor: No weakness  Coordination: Coordination normal       Gait: Gait normal       Deep Tendon Reflexes: Reflexes normal    Psychiatric:         Mood and Affect: Mood normal          Behavior: Behavior normal          Thought Content:  Thought content normal

## 2022-07-25 ENCOUNTER — OFFICE VISIT (OUTPATIENT)
Dept: FAMILY MEDICINE CLINIC | Facility: CLINIC | Age: 62
End: 2022-07-25
Payer: COMMERCIAL

## 2022-07-25 VITALS
HEIGHT: 60 IN | SYSTOLIC BLOOD PRESSURE: 122 MMHG | DIASTOLIC BLOOD PRESSURE: 72 MMHG | RESPIRATION RATE: 16 BRPM | WEIGHT: 171.25 LBS | TEMPERATURE: 97.2 F | OXYGEN SATURATION: 98 % | BODY MASS INDEX: 33.62 KG/M2 | HEART RATE: 81 BPM

## 2022-07-25 DIAGNOSIS — Z85.3 HISTORY OF BREAST CANCER: ICD-10-CM

## 2022-07-25 DIAGNOSIS — I10 PRIMARY HYPERTENSION: Primary | ICD-10-CM

## 2022-07-25 DIAGNOSIS — Z87.891 HISTORY OF TOBACCO USE: ICD-10-CM

## 2022-07-25 PROBLEM — R03.0 ELEVATED BLOOD PRESSURE READING: Status: RESOLVED | Noted: 2022-04-04 | Resolved: 2022-07-25

## 2022-07-25 PROCEDURE — 99214 OFFICE O/P EST MOD 30 MIN: CPT | Performed by: INTERNAL MEDICINE

## 2022-07-25 RX ORDER — HYDROCHLOROTHIAZIDE 25 MG/1
12.5 TABLET ORAL DAILY
Qty: 30 TABLET | Refills: 3 | Status: SHIPPED | OUTPATIENT
Start: 2022-07-25

## 2022-07-25 NOTE — ASSESSMENT & PLAN NOTE
Well controlled on HCTZ-actually has to cut back to 1/2 a tablet daily becauise of afternoon dizziness

## 2023-07-13 ENCOUNTER — TELEPHONE (OUTPATIENT)
Dept: FAMILY MEDICINE CLINIC | Facility: CLINIC | Age: 63
End: 2023-07-13

## 2023-07-13 NOTE — TELEPHONE ENCOUNTER
Pt needs an appt not seen in almost a year. At her last appt she stated she was moving to [facility].

## 2023-07-13 NOTE — TELEPHONE ENCOUNTER
Needs a refill for her hydrochlorothiazide 25mg, , take 12.5mg daily     Pharmacy - [facility] , [facility]    Patient ph # [phone]

## 2023-07-26 NOTE — PROGRESS NOTES
Assessment/Plan:         Problem List Items Addressed This Visit        Cardiovascular and Mediastinum    Primary hypertension - Primary     Well controlled on HCTZ-actually has to cut back to 1/2 a tablet daily becauise of afternoon dizziness         Relevant Medications    hydrochlorothiazide (HYDRODIURIL) 25 mg tablet       Other    History of breast cancer     Hx of right breast lumpectomy and radiation therapy-needs to get mammogram (diagnostic)         History of tobacco use     Had LDCT in May which was normal                 Subjective:      Patient ID: Ioana Cain is a 62 y o  female  [facility] hx of HTN, breast cancer right breast, postmenopausal, history of tobacco use-bp good today-moving to [place]      The following portions of the patient's history were reviewed and updated as appropriate:   Past Medical History:  She has a past medical history of Allergy to lisinopril, Anxiety disorder, COVID-19 (06/02/2022), Hyperlipidemia, Hypertension, Malignant neoplasm of right female breast ([address] ), Migraine, Shoulder pain, Smoker, Sprain of right shoulder, and Vitamin D deficiency  ,  _______________________________________________________________________  Medical Problems:  does not have any pertinent problems on file ,  _______________________________________________________________________  Past Surgical History:   has a past surgical history that includes Appendectomy; Breast lumpectomy (Right, 2012); Tonsillectomy; Mammo (historical) (Bilateral, 08/17/2018); US breast needle loc right (Right, 07/05/2012); Mammo (historical) (Bilateral, 09/10/2019); and Mammo (historical) (Bilateral, 09/20/2017)  ,  _______________________________________________________________________  Family History:  family history includes Breast cancer in her maternal aunt;  Colon cancer in her father; Heart disease in her mother; Hypertension in her mother; Lung cancer in her mother ,  _______________________________________________________________________  Social History:   reports that she has quit smoking  She has a 2 50 pack-year smoking history  She has never used smokeless tobacco  She reports current alcohol use  She reports that she does not use drugs  ,  _______________________________________________________________________  Allergies:  is allergic to lisinopril     _______________________________________________________________________  Current Outpatient Medications   Medication Sig Dispense Refill    hydrochlorothiazide (HYDRODIURIL) 25 mg tablet Take 0 5 tablets (12 5 mg total) by mouth daily 30 tablet 3     No current facility-administered medications for this visit      _______________________________________________________________________  Review of Systems   Constitutional: Negative  Respiratory: Negative  Cardiovascular: Negative  Gastrointestinal: Negative  Musculoskeletal: Negative  Hematological: Negative  Psychiatric/Behavioral: Negative  Objective:  Vitals:    07/25/22 1013   BP: 122/72   BP Location: Left arm   Patient Position: Sitting   Cuff Size: Large   Pulse: 81   Resp: 16   Temp: (!) 97 2 °F (36 2 °C)   TempSrc: Temporal   SpO2: 98%   Weight: 77 7 kg (171 lb 4 oz)   Height: 5' (1 524 m)     Body mass index is 33 44 kg/m²  Physical Exam  Constitutional:       Appearance: Normal appearance  HENT:      Head: Normocephalic and atraumatic  Right Ear: External ear normal       Left Ear: External ear normal       Nose: Nose normal       Mouth/Throat:      Mouth: Mucous membranes are moist    Eyes:      Extraocular Movements: Extraocular movements intact  Cardiovascular:      Rate and Rhythm: Normal rate and regular rhythm  Heart sounds: Normal heart sounds  Pulmonary:      Breath sounds: Normal breath sounds  Musculoskeletal:         General: Normal range of motion        Cervical back: Normal range of motion and neck supple  Skin:     General: Skin is warm  Neurological:      General: No focal deficit present  Mental Status: She is alert and oriented to person, place, and time  Psychiatric:         Mood and Affect: Mood normal          Behavior: Behavior normal          Thought Content:  Thought content normal  Attending with